# Patient Record
Sex: FEMALE
[De-identification: names, ages, dates, MRNs, and addresses within clinical notes are randomized per-mention and may not be internally consistent; named-entity substitution may affect disease eponyms.]

---

## 2024-05-16 PROBLEM — Z00.00 ENCOUNTER FOR PREVENTIVE HEALTH EXAMINATION: Status: ACTIVE | Noted: 2024-05-16

## 2024-05-20 ENCOUNTER — APPOINTMENT (OUTPATIENT)
Dept: ORTHOPEDIC SURGERY | Facility: CLINIC | Age: 69
End: 2024-05-20
Payer: MEDICARE

## 2024-05-20 VITALS — BODY MASS INDEX: 46.65 KG/M2 | HEIGHT: 65 IN | WEIGHT: 280 LBS

## 2024-05-20 DIAGNOSIS — Z86.79 PERSONAL HISTORY OF OTHER DISEASES OF THE CIRCULATORY SYSTEM: ICD-10-CM

## 2024-05-20 DIAGNOSIS — Z72.3 LACK OF PHYSICAL EXERCISE: ICD-10-CM

## 2024-05-20 DIAGNOSIS — G89.29 PAIN IN LEFT KNEE: ICD-10-CM

## 2024-05-20 DIAGNOSIS — Z83.3 FAMILY HISTORY OF DIABETES MELLITUS: ICD-10-CM

## 2024-05-20 DIAGNOSIS — Z78.9 OTHER SPECIFIED HEALTH STATUS: ICD-10-CM

## 2024-05-20 DIAGNOSIS — Z86.718 PERSONAL HISTORY OF OTHER VENOUS THROMBOSIS AND EMBOLISM: ICD-10-CM

## 2024-05-20 DIAGNOSIS — M17.0 BILATERAL PRIMARY OSTEOARTHRITIS OF KNEE: ICD-10-CM

## 2024-05-20 DIAGNOSIS — M25.562 PAIN IN LEFT KNEE: ICD-10-CM

## 2024-05-20 PROCEDURE — 20610 DRAIN/INJ JOINT/BURSA W/O US: CPT | Mod: 59,LT

## 2024-05-20 PROCEDURE — 99203 OFFICE O/P NEW LOW 30 MIN: CPT | Mod: 25

## 2024-05-20 PROCEDURE — 73564 X-RAY EXAM KNEE 4 OR MORE: CPT | Mod: 50

## 2024-05-20 RX ORDER — VALSARTAN 40 MG/1
TABLET, COATED ORAL
Refills: 0 | Status: ACTIVE | COMMUNITY

## 2024-05-20 RX ORDER — RIVAROXABAN 2.5 MG/1
TABLET, FILM COATED ORAL
Refills: 0 | Status: ACTIVE | COMMUNITY

## 2024-05-20 NOTE — ASSESSMENT
[FreeTextEntry1] : 67 y/o female with severe left and moderate right knee osteoarthritis Physical therapy,  Tylenol as needed,  heat and ice.  Home exercises  Cane. Steroid injection was done today in the left knee and hopefully will give her some relief. She will see if that helps.  She did have excellent relief with the lidocaine.  She may consider an injection in her right knee.  If it does not work or if it does not work for a while. Related history of hyaluronic acid injections asked. Eventually knee replacement may be considered but we will first try more nonoperative treatment. She is working on weight loss.  She should try to find exercises that she can do to help that do not aggravate the knee arthritis. She will follow-up after trying therapy depending on how it is doing.

## 2024-05-20 NOTE — PROCEDURE
[Injection] : Injection [Left] : of the left [Knee Joint] : knee joint [Osteoarthritis] : Osteoarthritis [Patient] : patient [Risk] : risk [Benefits] : benefits [Alternatives] : alternatives [Bleeding] : bleeding [Infection] : infection [Allergic Reaction] : allergic reaction [Verbal Consent Obtained] : verbal consent was obtained prior to the procedure [Alcohol] : Alcohol [Ethyl Chloride Spray] : ethyl chloride spray was used as a topical anesthetic [Lateral] : lateral [1% Lidocaine___(mL)] : [unfilled] mL of 1% Lidocaine [Triamcinolone 40mg/mL___(mL)] : [unfilled] ~UmL of 40mg/mL triamcinolone [Bandage Applied] : a bandage [Tolerated Well] : The patient tolerated the procedure well [None] : none [No Strenuous Activity___day(s)] : avoid strenuous activity for [unfilled] day(s) [PRN] : as needed [22] : a 22-gauge [FreeTextEntry1] : chloraprep

## 2024-05-20 NOTE — PHYSICAL EXAM
[LE] : Sensory: Intact in bilateral lower extremities [Normal RLE] : Right Lower Extremity: No scars, rashes, lesions, ulcers, skin intact [Normal LLE] : Left Lower Extremity: No scars, rashes, lesions, ulcers, skin intact [Normal Touch] : sensation intact for touch [Normal] : Oriented to person, place, and time, insight and judgement were intact and the affect was normal [Obese] : obese [de-identified] : Knees Antalgic gait favoring left more than right Trace effusion left knee Tender left knee medial joint line of the patella facets and mildly on the lateral joint line. Less tender right knee medial joint line Left knee range of motion 0 to 115 degrees flexion with patellofemoral crepitus versus right knee 0 to 125 degrees. Ia Lachman.  Negative anterior and posterior drawer.  Normal varus and valgus laxity. Intact extensor mechanism. Normal neurovascular exam. [de-identified] :  X-rays ordered for knee pain, performed and reviewed today of bilateral knees weightbearing 4 views showed tricompartmental degenerative changes in both knees left more severe than the right with severe medial compartment arthrosis left knee with joint space narrowing and osteophytes and more moderate on the right knee.  Moderately severe bilateral patellofemoral arthrosis.  KL 4 changes

## 2024-05-20 NOTE — HISTORY OF PRESENT ILLNESS
[de-identified] :  Ms. Senior is a 68 year old woman who comes in for evaluation for bilateral LEFT > RIGTH knee pain that started 10+ years ago. She had left anterior knee arthroscopy for meniscus tear and has continued to have pain since. She has not seen anyone since or had treatment or injections.  She has a lot of pain with walking and stairs. She has to take stairs one at a time. She uses a cane when out of the house. She has tried Tylenol in the past but it doesn't help relieve pain. She cannot take NSAIDs as she is on Xarelto. She uses a knee strap on LEFT knee. She rates her pain 0-7/10. She lost 70lbs but has put most of the weight back on.

## 2024-06-02 ENCOUNTER — TRANSCRIPTION ENCOUNTER (OUTPATIENT)
Age: 69
End: 2024-06-02